# Patient Record
Sex: FEMALE | Race: WHITE | Employment: FULL TIME | URBAN - METROPOLITAN AREA
[De-identification: names, ages, dates, MRNs, and addresses within clinical notes are randomized per-mention and may not be internally consistent; named-entity substitution may affect disease eponyms.]

---

## 2018-04-16 ENCOUNTER — ANESTHESIA EVENT (OUTPATIENT)
Dept: MEDSURG UNIT | Age: 38
End: 2018-04-16
Payer: COMMERCIAL

## 2018-04-16 RX ORDER — BISMUTH SUBSALICYLATE 262 MG
1 TABLET,CHEWABLE ORAL DAILY
COMMUNITY

## 2018-04-17 ENCOUNTER — HOSPITAL ENCOUNTER (OUTPATIENT)
Age: 38
Setting detail: OUTPATIENT SURGERY
Discharge: HOME | End: 2018-04-17
Attending: ORTHOPAEDIC SURGERY | Admitting: ORTHOPAEDIC SURGERY
Payer: COMMERCIAL

## 2018-04-17 ENCOUNTER — ANESTHESIA (OUTPATIENT)
Dept: MEDSURG UNIT | Age: 38
End: 2018-04-17
Payer: COMMERCIAL

## 2018-04-17 VITALS
OXYGEN SATURATION: 100 % | BODY MASS INDEX: 25.71 KG/M2 | RESPIRATION RATE: 12 BRPM | HEIGHT: 66 IN | DIASTOLIC BLOOD PRESSURE: 71 MMHG | WEIGHT: 160 LBS | TEMPERATURE: 97.4 F | HEART RATE: 62 BPM | SYSTOLIC BLOOD PRESSURE: 109 MMHG

## 2018-04-17 DIAGNOSIS — M25.831 ULNAR IMPACTION SYNDROME, RIGHT: Primary | ICD-10-CM

## 2018-04-17 LAB
HCG UR QL: NEGATIVE
HGB BLD-MCNC: 11.9 G/DL (ref 11.5–16)

## 2018-04-17 PROCEDURE — 85018 HEMOGLOBIN: CPT

## 2018-04-17 PROCEDURE — 77030006877 HC BLD SHV FLL RAD S&N -B: Performed by: ORTHOPAEDIC SURGERY

## 2018-04-17 PROCEDURE — 77030020782 HC GWN BAIR PAWS FLX 3M -B

## 2018-04-17 PROCEDURE — C1713 ANCHOR/SCREW BN/BN,TIS/BN: HCPCS | Performed by: ORTHOPAEDIC SURGERY

## 2018-04-17 PROCEDURE — 77030020255 HC SOL INJ LR 1000ML BG: Performed by: ORTHOPAEDIC SURGERY

## 2018-04-17 PROCEDURE — 74011250636 HC RX REV CODE- 250/636

## 2018-04-17 PROCEDURE — 76210000050 HC AMBSU PH II REC 0.5 TO 1 HR: Performed by: ORTHOPAEDIC SURGERY

## 2018-04-17 PROCEDURE — 77030012771 HC BIT DRL WRGH -B: Performed by: ORTHOPAEDIC SURGERY

## 2018-04-17 PROCEDURE — 74011250636 HC RX REV CODE- 250/636: Performed by: ANESTHESIOLOGY

## 2018-04-17 PROCEDURE — 74011000250 HC RX REV CODE- 250

## 2018-04-17 PROCEDURE — 76210000034 HC AMBSU PH I REC 0.5 TO 1 HR: Performed by: ORTHOPAEDIC SURGERY

## 2018-04-17 PROCEDURE — 76060000063 HC AMB SURG ANES 1.5 TO 2 HR: Performed by: ORTHOPAEDIC SURGERY

## 2018-04-17 PROCEDURE — 77030003601 HC NDL NRV BLK BBMI -A

## 2018-04-17 PROCEDURE — 81025 URINE PREGNANCY TEST: CPT

## 2018-04-17 PROCEDURE — 77030033681 HC SPLNT P-CUT SAF BSNM -A: Performed by: ORTHOPAEDIC SURGERY

## 2018-04-17 PROCEDURE — 77030034538: Performed by: ORTHOPAEDIC SURGERY

## 2018-04-17 PROCEDURE — 77030018836 HC SOL IRR NACL ICUM -A: Performed by: ORTHOPAEDIC SURGERY

## 2018-04-17 PROCEDURE — 77030020274 HC MISC IMPL ORTHOPEDIC: Performed by: ORTHOPAEDIC SURGERY

## 2018-04-17 PROCEDURE — 74011250636 HC RX REV CODE- 250/636: Performed by: ORTHOPAEDIC SURGERY

## 2018-04-17 PROCEDURE — 77030002933 HC SUT MCRYL J&J -A: Performed by: ORTHOPAEDIC SURGERY

## 2018-04-17 PROCEDURE — 77030020268 HC MISC GENERAL SUPPLY: Performed by: ORTHOPAEDIC SURGERY

## 2018-04-17 PROCEDURE — 77030020754 HC CUF TRNQT 2BLA STRY -B: Performed by: ORTHOPAEDIC SURGERY

## 2018-04-17 PROCEDURE — 76030000003 HC AMB SURG OR TIME 1.5 TO 2: Performed by: ORTHOPAEDIC SURGERY

## 2018-04-17 PROCEDURE — 77030031139 HC SUT VCRL2 J&J -A: Performed by: ORTHOPAEDIC SURGERY

## 2018-04-17 DEVICE — SCREW
Type: IMPLANTABLE DEVICE | Site: WRIST | Status: FUNCTIONAL
Brand: RAYHACK

## 2018-04-17 DEVICE — CORTICAL SCREW
Type: IMPLANTABLE DEVICE | Site: WRIST | Status: FUNCTIONAL
Brand: RAYHACK

## 2018-04-17 DEVICE — IMPLANTABLE DEVICE
Type: IMPLANTABLE DEVICE | Site: WRIST | Status: FUNCTIONAL
Brand: RAYHACK

## 2018-04-17 RX ORDER — SODIUM CHLORIDE, SODIUM LACTATE, POTASSIUM CHLORIDE, CALCIUM CHLORIDE 600; 310; 30; 20 MG/100ML; MG/100ML; MG/100ML; MG/100ML
125 INJECTION, SOLUTION INTRAVENOUS CONTINUOUS
Status: DISCONTINUED | OUTPATIENT
Start: 2018-04-17 | End: 2018-04-17 | Stop reason: HOSPADM

## 2018-04-17 RX ORDER — MIDAZOLAM HYDROCHLORIDE 1 MG/ML
INJECTION, SOLUTION INTRAMUSCULAR; INTRAVENOUS AS NEEDED
Status: DISCONTINUED | OUTPATIENT
Start: 2018-04-17 | End: 2018-04-17 | Stop reason: HOSPADM

## 2018-04-17 RX ORDER — MIDAZOLAM HYDROCHLORIDE 1 MG/ML
1 INJECTION, SOLUTION INTRAMUSCULAR; INTRAVENOUS AS NEEDED
Status: DISCONTINUED | OUTPATIENT
Start: 2018-04-17 | End: 2018-04-17 | Stop reason: HOSPADM

## 2018-04-17 RX ORDER — PROPOFOL 10 MG/ML
INJECTION, EMULSION INTRAVENOUS
Status: DISCONTINUED | OUTPATIENT
Start: 2018-04-17 | End: 2018-04-17 | Stop reason: HOSPADM

## 2018-04-17 RX ORDER — PROPOFOL 10 MG/ML
INJECTION, EMULSION INTRAVENOUS AS NEEDED
Status: DISCONTINUED | OUTPATIENT
Start: 2018-04-17 | End: 2018-04-17 | Stop reason: HOSPADM

## 2018-04-17 RX ORDER — MIDAZOLAM HYDROCHLORIDE 1 MG/ML
0.5 INJECTION, SOLUTION INTRAMUSCULAR; INTRAVENOUS
Status: DISCONTINUED | OUTPATIENT
Start: 2018-04-17 | End: 2018-04-17 | Stop reason: HOSPADM

## 2018-04-17 RX ORDER — HYDROCODONE BITARTRATE AND ACETAMINOPHEN 5; 325 MG/1; MG/1
1 TABLET ORAL
Qty: 35 TAB | Refills: 0 | Status: SHIPPED | OUTPATIENT
Start: 2018-04-17

## 2018-04-17 RX ORDER — MORPHINE SULFATE 10 MG/ML
2 INJECTION, SOLUTION INTRAMUSCULAR; INTRAVENOUS
Status: DISCONTINUED | OUTPATIENT
Start: 2018-04-17 | End: 2018-04-17 | Stop reason: HOSPADM

## 2018-04-17 RX ORDER — ONDANSETRON 2 MG/ML
4 INJECTION INTRAMUSCULAR; INTRAVENOUS AS NEEDED
Status: DISCONTINUED | OUTPATIENT
Start: 2018-04-17 | End: 2018-04-17 | Stop reason: HOSPADM

## 2018-04-17 RX ORDER — LIDOCAINE HYDROCHLORIDE 20 MG/ML
INJECTION, SOLUTION EPIDURAL; INFILTRATION; INTRACAUDAL; PERINEURAL AS NEEDED
Status: DISCONTINUED | OUTPATIENT
Start: 2018-04-17 | End: 2018-04-17 | Stop reason: HOSPADM

## 2018-04-17 RX ORDER — FENTANYL CITRATE 50 UG/ML
25 INJECTION, SOLUTION INTRAMUSCULAR; INTRAVENOUS
Status: DISCONTINUED | OUTPATIENT
Start: 2018-04-17 | End: 2018-04-17 | Stop reason: HOSPADM

## 2018-04-17 RX ORDER — SODIUM CHLORIDE 0.9 % (FLUSH) 0.9 %
5-10 SYRINGE (ML) INJECTION AS NEEDED
Status: DISCONTINUED | OUTPATIENT
Start: 2018-04-17 | End: 2018-04-17 | Stop reason: HOSPADM

## 2018-04-17 RX ORDER — SODIUM CHLORIDE 0.9 % (FLUSH) 0.9 %
5-10 SYRINGE (ML) INJECTION EVERY 8 HOURS
Status: DISCONTINUED | OUTPATIENT
Start: 2018-04-17 | End: 2018-04-17 | Stop reason: HOSPADM

## 2018-04-17 RX ORDER — FENTANYL CITRATE 50 UG/ML
50 INJECTION, SOLUTION INTRAMUSCULAR; INTRAVENOUS AS NEEDED
Status: DISCONTINUED | OUTPATIENT
Start: 2018-04-17 | End: 2018-04-17 | Stop reason: HOSPADM

## 2018-04-17 RX ORDER — LIDOCAINE HYDROCHLORIDE 10 MG/ML
0.1 INJECTION, SOLUTION EPIDURAL; INFILTRATION; INTRACAUDAL; PERINEURAL AS NEEDED
Status: DISCONTINUED | OUTPATIENT
Start: 2018-04-17 | End: 2018-04-17 | Stop reason: HOSPADM

## 2018-04-17 RX ORDER — ONDANSETRON 2 MG/ML
INJECTION INTRAMUSCULAR; INTRAVENOUS AS NEEDED
Status: DISCONTINUED | OUTPATIENT
Start: 2018-04-17 | End: 2018-04-17 | Stop reason: HOSPADM

## 2018-04-17 RX ORDER — CEFAZOLIN SODIUM IN 0.9 % NACL 2 G/100 ML
PLASTIC BAG, INJECTION (ML) INTRAVENOUS AS NEEDED
Status: DISCONTINUED | OUTPATIENT
Start: 2018-04-17 | End: 2018-04-17 | Stop reason: HOSPADM

## 2018-04-17 RX ORDER — OXYCODONE AND ACETAMINOPHEN 5; 325 MG/1; MG/1
1 TABLET ORAL AS NEEDED
Status: DISCONTINUED | OUTPATIENT
Start: 2018-04-17 | End: 2018-04-17 | Stop reason: HOSPADM

## 2018-04-17 RX ORDER — FENTANYL CITRATE 50 UG/ML
INJECTION, SOLUTION INTRAMUSCULAR; INTRAVENOUS AS NEEDED
Status: DISCONTINUED | OUTPATIENT
Start: 2018-04-17 | End: 2018-04-17 | Stop reason: HOSPADM

## 2018-04-17 RX ORDER — DIPHENHYDRAMINE HYDROCHLORIDE 50 MG/ML
12.5 INJECTION, SOLUTION INTRAMUSCULAR; INTRAVENOUS AS NEEDED
Status: DISCONTINUED | OUTPATIENT
Start: 2018-04-17 | End: 2018-04-17 | Stop reason: HOSPADM

## 2018-04-17 RX ORDER — SODIUM CHLORIDE 9 MG/ML
25 INJECTION, SOLUTION INTRAVENOUS CONTINUOUS
Status: DISCONTINUED | OUTPATIENT
Start: 2018-04-17 | End: 2018-04-17 | Stop reason: HOSPADM

## 2018-04-17 RX ADMIN — LIDOCAINE HYDROCHLORIDE 50 MG: 20 INJECTION, SOLUTION EPIDURAL; INFILTRATION; INTRACAUDAL; PERINEURAL at 13:19

## 2018-04-17 RX ADMIN — PROPOFOL 30 MG: 10 INJECTION, EMULSION INTRAVENOUS at 13:37

## 2018-04-17 RX ADMIN — SODIUM CHLORIDE, SODIUM LACTATE, POTASSIUM CHLORIDE, AND CALCIUM CHLORIDE 125 ML/HR: 600; 310; 30; 20 INJECTION, SOLUTION INTRAVENOUS at 12:00

## 2018-04-17 RX ADMIN — FENTANYL CITRATE 25 MCG: 50 INJECTION, SOLUTION INTRAMUSCULAR; INTRAVENOUS at 14:46

## 2018-04-17 RX ADMIN — ONDANSETRON 4 MG: 2 INJECTION INTRAMUSCULAR; INTRAVENOUS at 14:47

## 2018-04-17 RX ADMIN — FENTANYL CITRATE 25 MCG: 50 INJECTION, SOLUTION INTRAMUSCULAR; INTRAVENOUS at 13:31

## 2018-04-17 RX ADMIN — PROPOFOL 20 MG: 10 INJECTION, EMULSION INTRAVENOUS at 14:12

## 2018-04-17 RX ADMIN — PROPOFOL 50 MG: 10 INJECTION, EMULSION INTRAVENOUS at 13:19

## 2018-04-17 RX ADMIN — FENTANYL CITRATE 25 MCG: 50 INJECTION, SOLUTION INTRAMUSCULAR; INTRAVENOUS at 14:12

## 2018-04-17 RX ADMIN — MIDAZOLAM HYDROCHLORIDE 2 MG: 1 INJECTION, SOLUTION INTRAMUSCULAR; INTRAVENOUS at 13:13

## 2018-04-17 RX ADMIN — SODIUM CHLORIDE, SODIUM LACTATE, POTASSIUM CHLORIDE, AND CALCIUM CHLORIDE: 600; 310; 30; 20 INJECTION, SOLUTION INTRAVENOUS at 15:01

## 2018-04-17 RX ADMIN — FENTANYL CITRATE 25 MCG: 50 INJECTION, SOLUTION INTRAMUSCULAR; INTRAVENOUS at 15:00

## 2018-04-17 RX ADMIN — Medication 2 G: at 13:22

## 2018-04-17 RX ADMIN — PROPOFOL 75 MCG/KG/MIN: 10 INJECTION, EMULSION INTRAVENOUS at 13:19

## 2018-04-17 NOTE — ANESTHESIA PROCEDURE NOTES
Peripheral Block    Performed by: Manuela Short  Authorized by: Manuela Short       Pre-procedure:    Indications: at surgeon's request, post-op pain management and primary anesthetic    Preanesthetic Checklist: patient identified, risks and benefits discussed, site marked, timeout performed, anesthesia consent given and patient being monitored      Block Type:   Block Type:  Supraclavicular  Laterality:  Right  Monitoring:  Standard ASA monitoring, heart rate, continuous pulse ox, responsive to questions, frequent vital sign checks and oxygen  Injection Technique:  Single shot  Procedures: ultrasound guided    Patient Position: supine  Prep: chlorhexidine    Location:  Supraclavicular  Needle Type:  Stimuplex  Needle Gauge:  22 G  Needle Localization:  Ultrasound guidance  ropivacaine  Volume (mL):  30    Assessment:  Number of attempts:  1  Injection Assessment:  Incremental injection every 5 mL, negative aspiration for CSF, no paresthesia, local visualized surrounding nerve on ultrasound, negative aspiration for blood and no intravascular symptoms  Patient tolerance:  Patient tolerated the procedure well with no immediate complications  Ultrasound unplugged and picture lost

## 2018-04-17 NOTE — IP AVS SNAPSHOT
Summary of Care Report The Summary of Care report has been created to help improve care coordination. Users with access to [employer] or [address] (Web-based application) may access additional patient information including the Discharge Summary. If you are not currently a [address] user and need more information, please call the number listed below in the [place] [address] section and ask to be connected with Medical Records. Facility Information Name Address Phone [address] [phone] Patient Information Patient Name Sex  Matti Cheung ([id number]) Female 1980 Discharge Information Admitting Provider Service Area Unit Phoebe Dozier MD / [address] [place] [address] Drive / [phone] Discharge Provider Discharge Date/Time Discharge Disposition Destination (none) 2018 (Pending) AHR (none) Patient Language Language ENGLISH [13] Hospital Problems as of 2018  Reviewed: 2018 12:25 PM by Tania Youssef CRNA None Non-Hospital Problems as of 2018  Reviewed: 2018 12:25 PM by Tania Youssef CRNA None You are allergic to the following No active allergies Current Discharge Medication List  
  
START taking these medications Dose & Instructions Dispensing Information Comments HYDROcodone-acetaminophen 5-325 mg per tablet Commonly known as:  [address] Dose:  1 Tab Take 1 Tab by mouth every four (4) hours as needed for Pain. Max Daily Amount: 6 Tabs. Quantity:  35 Tab Refills:  0 CONTINUE these medications which have NOT CHANGED Dose & Instructions Dispensing Information Comments  
 multivitamin tablet Commonly known as:  ONE A DAY Dose:  1 Tab Take 1 Tab by mouth daily. Refills:  0 Surgery Information ID Date/Time Status Primary Surgeon All Procedures Location [id number] 4/17/2018 1230 Unposted Kim Chaney MD RIGHT WRIST ARTHROSCOPY & OPEN  ULNAR SHORTENING OSTEOTOMY  (BLOCK) [facility] AMBULATORY OR Follow-up Information Follow up With Details Comments Contact Info None   None (395) Patient stated that they have no PCP Discharge Instructions DISCHARGE SUMMARY from Nurse PATIENT INSTRUCTIONS: 
 
 
F-face looks uneven A-arms unable to move or move unevenly S-speech slurred or non-existent T-time-call 911 as soon as signs and symptoms begin-DO NOT go Back to bed or wait to see if you get better-TIME IS BRAIN. Warning Signs of HEART ATTACK Call 911 if you have these symptoms: 
? Chest discomfort. Most heart attacks involve discomfort in the center of the chest that lasts more than a few minutes, or that goes away and comes back. It can feel like uncomfortable pressure, squeezing, fullness, or pain. ? Discomfort in other areas of the upper body. Symptoms can include pain or discomfort in one or both arms, the back, neck, jaw, or stomach. ? Shortness of breath with or without chest discomfort. ? Other signs may include breaking out in a cold sweat, nausea, or lightheadedness. Don't wait more than five minutes to call [address]! Fast action can save your life. Calling 911 is almost always the fastest way to get lifesaving treatment. Emergency Medical Services staff can begin treatment when they arrive  up to an hour sooner than if someone gets to the hospital by car. The discharge information has been reviewed with the spouse. The spouse verbalized understanding. Discharge medications reviewed with the spouse and appropriate educational materials and side effects teaching were provided. ___________________________________________________________________________________________________________________________________ Chart Review Routing History No Routing History on File

## 2018-04-17 NOTE — IP AVS SNAPSHOT
[address] 
[phone] Patient: Sung Chan MRN: [id number] CEB:[birth year] About your hospitalization You were admitted on:  April 17, 2018 You last received care in the:  [facility] PACU You were discharged on:  April 17, 2018 Why you were hospitalized Your primary diagnosis was:  Not on File Follow-up Information Follow up With Details Comments Contact Info None   None (395) Patient stated that they have no PCP Discharge Orders None A check inés indicates which time of day the medication should be taken. My Medications START taking these medications Instructions Each Dose to Equal  
 Morning Noon Evening Bedtime HYDROcodone-acetaminophen 5-325 mg per tablet Commonly known as:  Wilber Quiles Your last dose was: Your next dose is: Take 1 Tab by mouth every four (4) hours as needed for Pain. Max Daily Amount: 6 Tabs. 1 Tab CONTINUE taking these medications Instructions Each Dose to Equal  
 Morning Noon Evening Bedtime  
 multivitamin tablet Commonly known as:  ONE A DAY Your last dose was: Your next dose is: Take 1 Tab by mouth daily. 1 Tab Where to Get Your Medications Information on where to get these meds will be given to you by the nurse or doctor. ! Ask your nurse or doctor about these medications HYDROcodone-acetaminophen 5-325 mg per tablet Opioid Education Prescription Opioids: What You Need to Know: 
 
 
 
F-face looks uneven A-arms unable to move or move unevenly S-speech slurred or non-existent T-time-call 911 as soon as signs and symptoms begin-DO NOT go Back to bed or wait to see if you get better-TIME IS BRAIN. Warning Signs of HEART ATTACK Call 911 if you have these symptoms: 
? Chest discomfort. Most heart attacks involve discomfort in the center of the chest that lasts more than a few minutes, or that goes away and comes back. It can feel like uncomfortable pressure, squeezing, fullness, or pain. ? Discomfort in other areas of the upper body. Symptoms can include pain or discomfort in one or both arms, the back, neck, jaw, or stomach. ? Shortness of breath with or without chest discomfort. ? Other signs may include breaking out in a cold sweat, nausea, or lightheadedness. Don't wait more than five minutes to call [address]! Fast action can save your life. Calling 911 is almost always the fastest way to get lifesaving treatment. Emergency Medical Services staff can begin treatment when they arrive  up to an hour sooner than if someone gets to the hospital by car. The discharge information has been reviewed with the spouse. The spouse verbalized understanding. Discharge medications reviewed with the spouse and appropriate educational materials and side effects teaching were provided. ___________________________________________________________________________________________________________________________________ Introducing [facility]! [address] introduces [employer] patient portal. Now you can access parts of your medical record, email your doctor's office, and request medication refills online.    
 
1. In your internet browser, go to [email or web address] 2. Click on the First Time User? Click Here link in the Sign In box. You will see the New Member Sign Up page. 3. Enter your [employer] Access Code exactly as it appears below. You will not need to use this code after youve completed the sign-up process. If you do not sign up before the expiration date, you must request a new code. · [employer] Access Code: [email or web address] Expires: 7/4/2018 11:04 AM 
 
4. Enter the last four digits of your Social Security Number (xxxx) and Date of Birth (mm/dd/yyyy) as indicated and click Submit. You will be taken to the next sign-up page. 5. Create a Cinemagramt ID. This will be your [employer] login ID and cannot be changed, so think of one that is secure and easy to remember. 6. Create a [employer] password. You can change your password at any time. 7. Enter your Password Reset Question and Answer. This can be used at a later time if you forget your password. 8. Enter your e-mail address. You will receive e-mail notification when new information is available in [address] 9. Click Sign Up. You can now view and download portions of your medical record. 10. Click the Download Summary menu link to download a portable copy of your medical information. If you have questions, please visit the Frequently Asked Questions section of the [employer] website. Remember, [employer] is NOT to be used for urgent needs. For medical emergencies, dial 911. Now available from your iPhone and Android! Introducing Kin [employer]wa As a [facility] patient, I wanted to make you aware of our electronic visit tool called Kin Bajwa. [facility] 24/7 allows you to connect within minutes with a medical provider 24 hours a day, seven days a week via a mobile device or tablet or logging into a secure website from your computer. You can access Kin Bajwa from anywhere in the [place]. A virtual visit might be right for you when you have a simple condition and feel like you just dont want to get out of bed, or cant get away from work for an appointment, when your regular [facility] provider is not available (evenings, weekends or holidays), or when youre out of town and need minor care. Electronic visits cost only $49 and if the [employer] 24/[employer] provider determines a prescription is needed to treat your condition, one can be electronically transmitted to a nearby pharmacy*. Please take a moment to enroll today if you have not already done so. The enrollment process is free and takes just a few minutes. To enroll, please download the [employer] sherif to your tablet or phone, or visit [email or web address] to enroll on your computer. And, as an [address] [place] [address] patient with a [employer] account, the results of your visits will be scanned into your electronic medical record and your primary care provider will be able to view the scanned results. We urge you to continue to see your regular [facility] provider for your ongoing medical care. And while your primary care provider may not be the one available when you seek a Kin Bajwa virtual visit, the peace of mind you get from getting a real diagnosis real time can be priceless. For more information on Kin [employer]wa, view our Frequently Asked Questions (FAQs) at [email or web address]. Sincerely, 
 
Lokesh Tijerina MD 
Chief Medical Officer [employer] *:  certain medications cannot be prescribed via Kin [employer]wa Providers Seen During Your Hospitalization Provider Specialty Primary office phone Hernan Harris MD Orthopedic Surgery [phone] Your Primary Care Physician (PCP) Primary Care Physician Office Phone Office Fax NONE ** None ** ** None ** You are allergic to the following No active allergies Recent Documentation Height Weight BMI OB Status Smoking Status 1.676 m 72.6 kg 25.82 kg/m2 Having regular periods Never Smoker Emergency Contacts Name Discharge Info Relation Home Work Mobile Nahun Ariza DISCHARGE CAREGIVER [3] Spouse [3] [phone] Patient Belongings The following personal items are in your possession at time of discharge: 
  Dental Appliances: None  Visual Aid: Glasses             Clothing:  (clothes in locker) Please provide this summary of care documentation to your next provider. Signatures-by signing, you are acknowledging that this After Visit Summary has been reviewed with you and you have received a copy. Patient Signature:  ____________________________________________________________ Date:  ____________________________________________________________  
  
Yamel Carmel Provider Signature:  ____________________________________________________________ Date:  ____________________________________________________________

## 2018-04-17 NOTE — PERIOP NOTES
Patient: Maddie Zimmerman MRN: [id number]  SSN: [id number]   YOB: 1980  Age: 34 y.o. Sex: female     Patient is status post Procedure(s):  RIGHT WRIST ARTHROSCOPY & OPEN  ULNAR SHORTENING OSTEOTOMY  (BLOCK). Surgeon(s) and Role:     * Ayah Correa MD - Primary    Local/Dose/Irrigation:  SEE MAR                  Peripheral IV 04/17/18 Left Hand (Active)   Site Assessment Clean, dry, & intact 4/17/2018 12:01 PM   Phlebitis Assessment 0 4/17/2018 12:01 PM   Infiltration Assessment 0 4/17/2018 12:01 PM   Dressing Status Clean, dry, & intact 4/17/2018 12:01 PM   Dressing Type Transparent 4/17/2018 12:01 PM   Hub Color/Line Status Blue 4/17/2018 12:01 PM                           Dressing/Packing:  Wound Wrist Right-DRESSING TYPE: 4 x 4;Cast padding;Elastic bandage; Xeroform (04/17/18 1300)  Splint/Cast: Wound Wrist Right-SPLINT TYPE/MATERIAL: Splint, fiberglass, Other(Comment) (4X15\" FIBERGLASS SPLINT)]    Other:

## 2018-04-17 NOTE — ANESTHESIA POSTPROCEDURE EVALUATION
Post-Anesthesia Evaluation and Assessment    Patient: Roselia Crespo MRN: [id number]  SSN: [id number]    YOB: 1980  Age: 34 y.o. Sex: female       Cardiovascular Function/Vital Signs  Visit Vitals    /46    Pulse 67    Temp 36.3 °C (97.4 °F)    Resp 15    Ht 5' 6\" (1.676 m)    Wt 72.6 kg (160 lb)    SpO2 99%    BMI 25.82 kg/m2       Patient is status post MAC, regional anesthesia for Procedure(s):  RIGHT WRIST ARTHROSCOPY & OPEN  ULNAR SHORTENING OSTEOTOMY  (BLOCK). Nausea/Vomiting: None    Postoperative hydration reviewed and adequate. Pain:  Pain Scale 1: Numeric (0 - 10) (04/17/18 1122)  Pain Intensity 1: 1 (04/17/18 1122)   Managed    Neurological Status:   Neuro (WDL): Within Defined Limits (04/17/18 1118)   At baseline    Mental Status and Level of Consciousness: Arousable    Pulmonary Status:   O2 Device: Nasal cannula (04/17/18 1516)   Adequate oxygenation and airway patent    Complications related to anesthesia: None    Post-anesthesia assessment completed.  No concerns    Signed By: Hugo Menchaca DO     April 17, 2018

## 2018-04-17 NOTE — DISCHARGE INSTRUCTIONS
DISCHARGE SUMMARY from Nurse    PATIENT INSTRUCTIONS:    After general anesthesia or intravenous sedation, for 24 hours or while taking prescription Narcotics:  · Limit your activities  · Do not drive and operate hazardous machinery  · Do not make important personal or business decisions  · Do  not drink alcoholic beverages  · If you have not urinated within 8 hours after discharge, please contact your surgeon on call. Report the following to your surgeon:  · Excessive pain, swelling, redness or odor of or around the surgical area  · Temperature over 100.5  · Nausea and vomiting lasting longer than 4 hours or if unable to take medications  · Any signs of decreased circulation or nerve impairment to extremity: change in color, persistent  numbness, tingling, coldness or increase pain  · Any questions    What to do at Home:  Recommended activity: See surgical instructions. If you experience any of the following symptoms as noted above, please follow up with Dr. Marylen Highman. *  Please give a list of your current medications to your Primary Care Provider. *  Please update this list whenever your medications are discontinued, doses are      changed, or new medications (including over-the-counter products) are added. *  Please carry medication information at all times in case of emergency situations. These are general instructions for a healthy lifestyle:    No smoking/ No tobacco products/ Avoid exposure to second hand smoke  Surgeon General's Warning:  Quitting smoking now greatly reduces serious risk to your health.     Obesity, smoking, and sedentary lifestyle greatly increases your risk for illness    A healthy diet, regular physical exercise & weight monitoring are important for maintaining a healthy lifestyle    You may be retaining fluid if you have a history of heart failure or if you experience any of the following symptoms:  Weight gain of 3 pounds or more overnight or 5 pounds in a week, increased swelling in our hands or feet or shortness of breath while lying flat in bed. Please call your doctor as soon as you notice any of these symptoms; do not wait until your next office visit. Recognize signs and symptoms of STROKE:    F-face looks uneven    A-arms unable to move or move unevenly    S-speech slurred or non-existent    T-time-call 911 as soon as signs and symptoms begin-DO NOT go       Back to bed or wait to see if you get better-TIME IS BRAIN. Warning Signs of HEART ATTACK     Call 911 if you have these symptoms:   Chest discomfort. Most heart attacks involve discomfort in the center of the chest that lasts more than a few minutes, or that goes away and comes back. It can feel like uncomfortable pressure, squeezing, fullness, or pain.  Discomfort in other areas of the upper body. Symptoms can include pain or discomfort in one or both arms, the back, neck, jaw, or stomach.  Shortness of breath with or without chest discomfort.  Other signs may include breaking out in a cold sweat, nausea, or lightheadedness. Don't wait more than five minutes to call 911 - MINUTES MATTER! Fast action can save your life. Calling 911 is almost always the fastest way to get lifesaving treatment. Emergency Medical Services staff can begin treatment when they arrive -- up to an hour sooner than if someone gets to the hospital by car. The discharge information has been reviewed with the spouse. The spouse verbalized understanding. Discharge medications reviewed with the spouse and appropriate educational materials and side effects teaching were provided.   ___________________________________________________________________________________________________________________________________

## 2018-04-17 NOTE — ANESTHESIA PREPROCEDURE EVALUATION
Anesthetic History   No history of anesthetic complications            Review of Systems / Medical History  Patient summary reviewed, nursing notes reviewed and pertinent labs reviewed    Pulmonary  Within defined limits                 Neuro/Psych   Within defined limits           Cardiovascular  Within defined limits                Exercise tolerance: >4 METS     GI/Hepatic/Renal  Within defined limits              Endo/Other  Within defined limits           Other Findings              Physical Exam    Airway  Mallampati: I  TM Distance: > 6 cm  Neck ROM: normal range of motion   Mouth opening: Normal     Cardiovascular  Regular rate and rhythm,  S1 and S2 normal,  no murmur, click, rub, or gallop             Dental  No notable dental hx       Pulmonary  Breath sounds clear to auscultation               Abdominal  GI exam deferred       Other Findings            Anesthetic Plan    ASA: 1  Anesthesia type: MAC and regional      Post-op pain plan if not by surgeon: peripheral nerve block single    Induction: Intravenous  Anesthetic plan and risks discussed with: Patient

## 2018-04-18 NOTE — OP NOTES
[address]   OPERATIVE REPORT    Nicholas Storey  MR#: [id number]  : 1980  ACCOUNT #: [de-identified]   DATE OF SERVICE: 2018    SURGEON:  Isabela Tipton MD     PREOPERATIVE DIAGNOSIS:  Right ulnar impaction syndrome. 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 PROCEDURES PERFORMED:  1. Wrist arthroscopy with debridement of the TFCC. 2.  Ulnar shortening osteotomy, right wrist.    ASSISTANT:  none     ANESTHESIA:  See anesthesia note     ESTIMATED BLOOD LOSS:  minimal     SPECIMENS REMOVED:  none     IMPLANTS:  See dictated body     INDICATION FOR SURGERY:  This is a 47-year-old female patient with the above condition. After discussing of the risks, benefits and alternatives including but not limited to DRUJ instability, pain, persistent ulnar deviation pain, nonunion, malunion, painful hardware, requirement for hardware removal, neurovascular damage, muscle damage, tendon damage, loss of motion, loss of strength and infection, she understood the risks and consented to the operation. DESCRIPTION OF PROCEDURE:  The patient was given a supraclavicular block in the preoperative holding area. She was then taken to the operating room and placed in supine position on the operative table. Tourniquet was applied to the right upper extremity. Appropriate time-out was performed confirming the proper patient, procedure and laterality. The right upper extremity was then prepped and draped in standard sterile fashion. Esmarch bandage used to exsanguinate the limb and tourniquet was inflated to 250 mmHg. The right wrist was put in the arthroscopy tower and 10 mL of saline was injected into the radiocarpal joint. The 3,4 portal was established by a nick and spread technique using a hemostat to enter the joint. A blunt cannula was put in, the camera was put in and diagnostic arthroscopy was performed.   The ulnar-sided portal, a 6R portal, was established by nick and spread method after inserting a needle and confirming through the arthroscope appropriate position. Probe was put in. The TFCC showed a central disk perforation and positive trampoline test.  No peripheral tears were seen. A shaver was put in and the central tear was thoroughly debrided. The lunate had a small amount of chondromalacia that was present as a kissing lesion that was outerbridge class 1. After appropriate debridement of the TFC, the mid carpal portion was scoped. A mid carpal radial portal was established and the scope was inserted. A carpal ulnar portal was established with a nick and spread method. Diagnostic arthroscopy revealed no abnormalities in the midcarpal joint. The scope was removed and the wrist was taken out of the traction tower. A shortening osteotomy was then performed by making an incision approximately 8 cm long over the subcutaneous border of the ulna. Dissection was taken volarly around the ulna subcutaneously and a key elevator was used to expose the bone. The Rayhack Generation II ulnar shortening plate was put into position and the 2 hole was marked. Jig was put into place and the 2, 3 and 4 holes were drilled. Screws were then used to fill the 2, 3 and 4 holes. While protecting the soft tissue using Fontenot, the 1 and 2 slots were cut using a saw blade. The wafer of bone was removed. The jig was removed. The plate was then put into position. The tightening jig was put in position and the appropriate holes were filled. The osteotomy site was then compressed. A homerun screw was placed using a lag by technique. The #5 hole was then filled with a cortical screw, the 1 and 6 holes were filled with locking screws, the jig was removed and screws were placed in the jig previous position. X-ray was taken to confirm appropriate placement. Piano key test was negative. Wound was irrigated with normal saline.   Subcutaneous tissue was closed using 3-0 Vicryl and skin was closed using 4-0 Monocryl. Portal sites were closed using 4-0 Monocryl. A volar slab splint was applied, tourniquet was let down, all fingers had excellent perfusion. Patient was awakened in stable condition. COMPLICATIONS:  None. CONDITION:  Stable. MD CASIE Ventura / Humble Alonso  D: 04/17/2018 15:03     T: 04/17/2018 20:16  JOB #: [id number]

## (undated) DEVICE — Device

## (undated) DEVICE — SOLUTION IV 1000ML 0.9% SOD CHL

## (undated) DEVICE — DRILL BIT: Brand: RAYHACK

## (undated) DEVICE — MEDI-VAC NON-CONDUCTIVE SUCTION TUBING: Brand: CARDINAL HEALTH

## (undated) DEVICE — SAW BLADE

## (undated) DEVICE — BONE TAP: Brand: RAYHACK

## (undated) DEVICE — DRAPE CARM MINI FOR IMAG SYS INSIGHT FLROSCN

## (undated) DEVICE — SOLUTION LACTATED RINGERS INJECTION USP

## (undated) DEVICE — DRAPE,REIN 53X77,STERILE: Brand: MEDLINE

## (undated) DEVICE — DRESSING,GAUZE,XEROFORM,CURAD,1"X8",ST: Brand: CURAD

## (undated) DEVICE — SUTURE MCRYL SZ 4-0 L18IN ABSRB UD L16MM PC-3 3/8 CIR PRIM Y845G

## (undated) DEVICE — ASTOUND STANDARD SURGICAL GOWN, XL: Brand: CONVERTORS

## (undated) DEVICE — NEEDLE HYPO 18GA L1.5IN PNK S STL HUB POLYPR SHLD REG BVL

## (undated) DEVICE — (D)STRIP SKN CLSR 0.5X4IN WHT --

## (undated) DEVICE — DISPOSABLE TOURNIQUET CUFF SINGLE BLADDER, DUAL PORT AND QUICK CONNECT CONNECTOR: Brand: COLOR CUFF

## (undated) DEVICE — SPLINT THMB W4XL15IN FBRGLS PD PRECUT LTWT DURABLE FAST SET

## (undated) DEVICE — TOWEL SURG W17XL27IN STD BLU COT NONFENESTRATED PREWASHED

## (undated) DEVICE — STERILE POLYISOPRENE POWDER-FREE SURGICAL GLOVES: Brand: PROTEXIS

## (undated) DEVICE — PADDING CAST 4 INX5 YD STRL

## (undated) DEVICE — SUTURE VCRL SZ 3-0 L27IN ABSRB UD CP-2 L26MM 1/2 CIR REV J868H

## (undated) DEVICE — GAUZE SPONGES,12 PLY: Brand: CURITY

## (undated) DEVICE — SET IRRIG W 96IN TBNG 4 LN FLX BG

## (undated) DEVICE — BANDAGE COMPR 9 FTX4 IN SMOOTH COMFORTABLE SYNTH ESMRK LF

## (undated) DEVICE — INFECTION CONTROL KIT SYS

## (undated) DEVICE — 1010 S-DRAPE TOWEL DRAPE 10/BX: Brand: STERI-DRAPE™

## (undated) DEVICE — BIPOLAR FORCEPS CORD,BANANA LEADS: Brand: VALLEYLAB

## (undated) DEVICE — NEEDLE HYPO 25GA L1.5IN BLU POLYPR HUB S STL REG BVL STR

## (undated) DEVICE — (D)PREP SKN CHLRAPRP APPL 26ML -- CONVERT TO ITEM 371833

## (undated) DEVICE — BULB SYRINGE, IRRIGATION WITH PROTECTIVE CAP, 60 CC, INDIVIDUALLY WRAPPED: Brand: DOVER

## (undated) DEVICE — DYONICS 2.9 MM FULL RADIUS BLADES,                                    7 CM LENGTH, RED, PACKAGED 6 PER                                    BOX, STERILE: Brand: DYONICS POWERMINI

## (undated) DEVICE — TIBURON HAND DRAPE: Brand: CONVERTORS

## (undated) DEVICE — SYR 10ML LUER LOK 1/5ML GRAD --

## (undated) DEVICE — HOOK LOCK LATEX FREE ELASTIC BANDAGE 4INX5YD